# Patient Record
Sex: FEMALE | Race: WHITE
[De-identification: names, ages, dates, MRNs, and addresses within clinical notes are randomized per-mention and may not be internally consistent; named-entity substitution may affect disease eponyms.]

---

## 2018-10-25 ENCOUNTER — APPOINTMENT (OUTPATIENT)
Dept: HUMAN REPRODUCTION | Facility: CLINIC | Age: 38
End: 2018-10-25
Payer: COMMERCIAL

## 2018-10-25 PROCEDURE — 99203 OFFICE O/P NEW LOW 30 MIN: CPT

## 2018-12-31 ENCOUNTER — APPOINTMENT (OUTPATIENT)
Dept: HUMAN REPRODUCTION | Facility: CLINIC | Age: 38
End: 2018-12-31
Payer: COMMERCIAL

## 2018-12-31 ENCOUNTER — TRANSCRIPTION ENCOUNTER (OUTPATIENT)
Age: 38
End: 2018-12-31

## 2018-12-31 PROCEDURE — 76830 TRANSVAGINAL US NON-OB: CPT

## 2018-12-31 PROCEDURE — 36415 COLL VENOUS BLD VENIPUNCTURE: CPT

## 2018-12-31 PROCEDURE — 99213 OFFICE O/P EST LOW 20 MIN: CPT | Mod: 25

## 2019-01-09 ENCOUNTER — APPOINTMENT (OUTPATIENT)
Dept: HUMAN REPRODUCTION | Facility: CLINIC | Age: 39
End: 2019-01-09
Payer: COMMERCIAL

## 2019-01-09 PROCEDURE — 36415 COLL VENOUS BLD VENIPUNCTURE: CPT

## 2019-01-09 PROCEDURE — 76830 TRANSVAGINAL US NON-OB: CPT

## 2019-01-09 PROCEDURE — 99213 OFFICE O/P EST LOW 20 MIN: CPT | Mod: 25

## 2019-01-14 ENCOUNTER — APPOINTMENT (OUTPATIENT)
Dept: HUMAN REPRODUCTION | Facility: CLINIC | Age: 39
End: 2019-01-14
Payer: COMMERCIAL

## 2019-01-14 PROCEDURE — 36415 COLL VENOUS BLD VENIPUNCTURE: CPT

## 2019-01-14 PROCEDURE — 76830 TRANSVAGINAL US NON-OB: CPT

## 2019-01-14 PROCEDURE — 99213 OFFICE O/P EST LOW 20 MIN: CPT | Mod: 25

## 2019-01-15 ENCOUNTER — APPOINTMENT (OUTPATIENT)
Dept: HUMAN REPRODUCTION | Facility: CLINIC | Age: 39
End: 2019-01-15
Payer: COMMERCIAL

## 2019-01-15 PROCEDURE — 58322 ARTIFICIAL INSEMINATION: CPT

## 2019-01-15 PROCEDURE — 89261 SPERM ISOLATION COMPLEX: CPT

## 2019-01-15 PROCEDURE — 99213 OFFICE O/P EST LOW 20 MIN: CPT | Mod: 25

## 2019-01-15 PROCEDURE — 58323 SPERM WASHING: CPT

## 2019-01-15 RX ORDER — LEVOTHYROXINE SODIUM 0.05 MG/1
50 TABLET ORAL DAILY
Qty: 30 | Refills: 3 | Status: ACTIVE | COMMUNITY
Start: 2019-01-15 | End: 1900-01-01

## 2019-01-16 ENCOUNTER — APPOINTMENT (OUTPATIENT)
Dept: HUMAN REPRODUCTION | Facility: CLINIC | Age: 39
End: 2019-01-16
Payer: SELF-PAY

## 2019-01-16 PROCEDURE — 58323 SPERM WASHING: CPT

## 2019-01-16 PROCEDURE — 58322 ARTIFICIAL INSEMINATION: CPT

## 2019-01-16 PROCEDURE — 89261 SPERM ISOLATION COMPLEX: CPT

## 2019-01-16 PROCEDURE — 99213 OFFICE O/P EST LOW 20 MIN: CPT | Mod: 25

## 2019-01-23 ENCOUNTER — APPOINTMENT (OUTPATIENT)
Dept: HUMAN REPRODUCTION | Facility: CLINIC | Age: 39
End: 2019-01-23
Payer: COMMERCIAL

## 2019-01-23 PROCEDURE — 76830 TRANSVAGINAL US NON-OB: CPT

## 2019-01-23 PROCEDURE — 36415 COLL VENOUS BLD VENIPUNCTURE: CPT

## 2019-01-23 PROCEDURE — 99213 OFFICE O/P EST LOW 20 MIN: CPT | Mod: 25

## 2019-01-31 ENCOUNTER — APPOINTMENT (OUTPATIENT)
Dept: HUMAN REPRODUCTION | Facility: CLINIC | Age: 39
End: 2019-01-31
Payer: COMMERCIAL

## 2019-01-31 PROCEDURE — 99213 OFFICE O/P EST LOW 20 MIN: CPT | Mod: 25

## 2019-01-31 PROCEDURE — 76830 TRANSVAGINAL US NON-OB: CPT

## 2019-01-31 PROCEDURE — 36415 COLL VENOUS BLD VENIPUNCTURE: CPT

## 2019-02-02 ENCOUNTER — APPOINTMENT (OUTPATIENT)
Dept: HUMAN REPRODUCTION | Facility: CLINIC | Age: 39
End: 2019-02-02
Payer: COMMERCIAL

## 2019-02-02 PROCEDURE — 36415 COLL VENOUS BLD VENIPUNCTURE: CPT

## 2019-02-02 PROCEDURE — 99213 OFFICE O/P EST LOW 20 MIN: CPT | Mod: 25

## 2019-02-02 PROCEDURE — 76830 TRANSVAGINAL US NON-OB: CPT

## 2019-02-06 ENCOUNTER — APPOINTMENT (OUTPATIENT)
Dept: HUMAN REPRODUCTION | Facility: CLINIC | Age: 39
End: 2019-02-06

## 2019-02-22 ENCOUNTER — APPOINTMENT (OUTPATIENT)
Dept: HUMAN REPRODUCTION | Facility: CLINIC | Age: 39
End: 2019-02-22
Payer: COMMERCIAL

## 2019-02-22 PROCEDURE — 36415 COLL VENOUS BLD VENIPUNCTURE: CPT

## 2019-02-22 PROCEDURE — 99213 OFFICE O/P EST LOW 20 MIN: CPT | Mod: 25

## 2019-02-22 PROCEDURE — 76830 TRANSVAGINAL US NON-OB: CPT

## 2019-02-25 ENCOUNTER — APPOINTMENT (OUTPATIENT)
Dept: HUMAN REPRODUCTION | Facility: CLINIC | Age: 39
End: 2019-02-25
Payer: COMMERCIAL

## 2019-02-25 PROCEDURE — 99213 OFFICE O/P EST LOW 20 MIN: CPT | Mod: 25

## 2019-02-25 PROCEDURE — 36415 COLL VENOUS BLD VENIPUNCTURE: CPT

## 2019-02-28 ENCOUNTER — APPOINTMENT (OUTPATIENT)
Dept: HUMAN REPRODUCTION | Facility: CLINIC | Age: 39
End: 2019-02-28
Payer: COMMERCIAL

## 2019-02-28 PROCEDURE — 36415 COLL VENOUS BLD VENIPUNCTURE: CPT

## 2019-02-28 PROCEDURE — 76830 TRANSVAGINAL US NON-OB: CPT

## 2019-02-28 PROCEDURE — 99213 OFFICE O/P EST LOW 20 MIN: CPT | Mod: 25

## 2019-03-02 ENCOUNTER — APPOINTMENT (OUTPATIENT)
Dept: HUMAN REPRODUCTION | Facility: CLINIC | Age: 39
End: 2019-03-02
Payer: COMMERCIAL

## 2019-03-02 PROCEDURE — 99213 OFFICE O/P EST LOW 20 MIN: CPT | Mod: 25

## 2019-03-02 PROCEDURE — 36415 COLL VENOUS BLD VENIPUNCTURE: CPT

## 2019-03-02 PROCEDURE — 76830 TRANSVAGINAL US NON-OB: CPT

## 2019-03-03 ENCOUNTER — TRANSCRIPTION ENCOUNTER (OUTPATIENT)
Age: 39
End: 2019-03-03

## 2019-03-05 ENCOUNTER — APPOINTMENT (OUTPATIENT)
Dept: HUMAN REPRODUCTION | Facility: CLINIC | Age: 39
End: 2019-03-05
Payer: COMMERCIAL

## 2019-03-05 PROCEDURE — 76830 TRANSVAGINAL US NON-OB: CPT

## 2019-03-05 PROCEDURE — 99213 OFFICE O/P EST LOW 20 MIN: CPT | Mod: 25

## 2019-03-05 PROCEDURE — 36415 COLL VENOUS BLD VENIPUNCTURE: CPT

## 2019-03-06 ENCOUNTER — RX RENEWAL (OUTPATIENT)
Age: 39
End: 2019-03-06

## 2019-03-08 ENCOUNTER — APPOINTMENT (OUTPATIENT)
Dept: HUMAN REPRODUCTION | Facility: CLINIC | Age: 39
End: 2019-03-08
Payer: COMMERCIAL

## 2019-03-08 PROCEDURE — 58322 ARTIFICIAL INSEMINATION: CPT

## 2019-03-08 PROCEDURE — 76830 TRANSVAGINAL US NON-OB: CPT

## 2019-03-08 PROCEDURE — 89261 SPERM ISOLATION COMPLEX: CPT

## 2019-03-08 PROCEDURE — 99213 OFFICE O/P EST LOW 20 MIN: CPT | Mod: 25

## 2019-03-23 ENCOUNTER — APPOINTMENT (OUTPATIENT)
Dept: HUMAN REPRODUCTION | Facility: CLINIC | Age: 39
End: 2019-03-23
Payer: COMMERCIAL

## 2019-03-23 PROCEDURE — 99213 OFFICE O/P EST LOW 20 MIN: CPT | Mod: 25

## 2019-03-23 PROCEDURE — 76830 TRANSVAGINAL US NON-OB: CPT

## 2019-03-23 PROCEDURE — 36415 COLL VENOUS BLD VENIPUNCTURE: CPT

## 2019-03-28 ENCOUNTER — APPOINTMENT (OUTPATIENT)
Dept: HUMAN REPRODUCTION | Facility: CLINIC | Age: 39
End: 2019-03-28
Payer: COMMERCIAL

## 2019-03-28 PROCEDURE — 76830 TRANSVAGINAL US NON-OB: CPT

## 2019-03-28 PROCEDURE — 99213 OFFICE O/P EST LOW 20 MIN: CPT | Mod: 25

## 2019-03-28 PROCEDURE — 36415 COLL VENOUS BLD VENIPUNCTURE: CPT

## 2019-04-01 ENCOUNTER — APPOINTMENT (OUTPATIENT)
Dept: HUMAN REPRODUCTION | Facility: CLINIC | Age: 39
End: 2019-04-01
Payer: COMMERCIAL

## 2019-04-01 PROCEDURE — 76830 TRANSVAGINAL US NON-OB: CPT

## 2019-04-01 PROCEDURE — 36415 COLL VENOUS BLD VENIPUNCTURE: CPT

## 2019-04-01 PROCEDURE — 99213 OFFICE O/P EST LOW 20 MIN: CPT | Mod: 25

## 2019-04-03 ENCOUNTER — APPOINTMENT (OUTPATIENT)
Dept: HUMAN REPRODUCTION | Facility: CLINIC | Age: 39
End: 2019-04-03
Payer: COMMERCIAL

## 2019-04-03 PROCEDURE — 99213 OFFICE O/P EST LOW 20 MIN: CPT | Mod: 25

## 2019-04-03 PROCEDURE — 76830 TRANSVAGINAL US NON-OB: CPT

## 2019-04-03 PROCEDURE — 36415 COLL VENOUS BLD VENIPUNCTURE: CPT

## 2019-04-04 ENCOUNTER — APPOINTMENT (OUTPATIENT)
Dept: HUMAN REPRODUCTION | Facility: CLINIC | Age: 39
End: 2019-04-04
Payer: COMMERCIAL

## 2019-04-04 PROCEDURE — 76830 TRANSVAGINAL US NON-OB: CPT

## 2019-04-04 PROCEDURE — 99213 OFFICE O/P EST LOW 20 MIN: CPT | Mod: 25

## 2019-04-04 PROCEDURE — 36415 COLL VENOUS BLD VENIPUNCTURE: CPT

## 2019-04-05 ENCOUNTER — APPOINTMENT (OUTPATIENT)
Dept: HUMAN REPRODUCTION | Facility: CLINIC | Age: 39
End: 2019-04-05

## 2019-04-05 ENCOUNTER — APPOINTMENT (OUTPATIENT)
Dept: HUMAN REPRODUCTION | Facility: CLINIC | Age: 39
End: 2019-04-05
Payer: COMMERCIAL

## 2019-04-05 PROCEDURE — 99213 OFFICE O/P EST LOW 20 MIN: CPT | Mod: 25

## 2019-04-05 PROCEDURE — 89261 SPERM ISOLATION COMPLEX: CPT

## 2019-04-05 PROCEDURE — 76830 TRANSVAGINAL US NON-OB: CPT

## 2019-04-05 PROCEDURE — 58322 ARTIFICIAL INSEMINATION: CPT

## 2019-04-25 ENCOUNTER — APPOINTMENT (OUTPATIENT)
Dept: HUMAN REPRODUCTION | Facility: CLINIC | Age: 39
End: 2019-04-25
Payer: COMMERCIAL

## 2019-04-25 PROCEDURE — 99213 OFFICE O/P EST LOW 20 MIN: CPT | Mod: 25

## 2019-04-25 PROCEDURE — 76830 TRANSVAGINAL US NON-OB: CPT

## 2019-04-25 PROCEDURE — 36415 COLL VENOUS BLD VENIPUNCTURE: CPT

## 2019-05-02 ENCOUNTER — APPOINTMENT (OUTPATIENT)
Dept: HUMAN REPRODUCTION | Facility: CLINIC | Age: 39
End: 2019-05-02
Payer: COMMERCIAL

## 2019-05-02 PROCEDURE — 36415 COLL VENOUS BLD VENIPUNCTURE: CPT

## 2019-05-02 PROCEDURE — 76830 TRANSVAGINAL US NON-OB: CPT

## 2019-05-02 PROCEDURE — 99213 OFFICE O/P EST LOW 20 MIN: CPT | Mod: 25

## 2019-05-03 ENCOUNTER — APPOINTMENT (OUTPATIENT)
Dept: HUMAN REPRODUCTION | Facility: CLINIC | Age: 39
End: 2019-05-03
Payer: COMMERCIAL

## 2019-05-03 PROCEDURE — 99214 OFFICE O/P EST MOD 30 MIN: CPT | Mod: 25

## 2019-05-03 PROCEDURE — 58322 ARTIFICIAL INSEMINATION: CPT

## 2019-05-03 PROCEDURE — 89261 SPERM ISOLATION COMPLEX: CPT

## 2021-01-19 ENCOUNTER — NON-APPOINTMENT (OUTPATIENT)
Age: 41
End: 2021-01-19

## 2021-01-19 ENCOUNTER — APPOINTMENT (OUTPATIENT)
Dept: INTERNAL MEDICINE | Facility: CLINIC | Age: 41
End: 2021-01-19
Payer: COMMERCIAL

## 2021-01-19 VITALS
WEIGHT: 103 LBS | HEART RATE: 67 BPM | RESPIRATION RATE: 17 BRPM | OXYGEN SATURATION: 100 % | HEIGHT: 60 IN | DIASTOLIC BLOOD PRESSURE: 75 MMHG | SYSTOLIC BLOOD PRESSURE: 128 MMHG | BODY MASS INDEX: 20.22 KG/M2 | TEMPERATURE: 97.4 F

## 2021-01-19 VITALS
BODY MASS INDEX: 20.22 KG/M2 | RESPIRATION RATE: 17 BRPM | HEIGHT: 60 IN | OXYGEN SATURATION: 100 % | WEIGHT: 103 LBS | HEART RATE: 67 BPM | TEMPERATURE: 97.4 F

## 2021-01-19 VITALS — SYSTOLIC BLOOD PRESSURE: 110 MMHG | DIASTOLIC BLOOD PRESSURE: 64 MMHG

## 2021-01-19 DIAGNOSIS — Z78.9 OTHER SPECIFIED HEALTH STATUS: ICD-10-CM

## 2021-01-19 DIAGNOSIS — Z80.1 FAMILY HISTORY OF MALIGNANT NEOPLASM OF TRACHEA, BRONCHUS AND LUNG: ICD-10-CM

## 2021-01-19 DIAGNOSIS — Z82.49 FAMILY HISTORY OF ISCHEMIC HEART DISEASE AND OTHER DISEASES OF THE CIRCULATORY SYSTEM: ICD-10-CM

## 2021-01-19 PROCEDURE — 99386 PREV VISIT NEW AGE 40-64: CPT | Mod: 25

## 2021-01-19 PROCEDURE — 99072 ADDL SUPL MATRL&STAF TM PHE: CPT

## 2021-01-19 PROCEDURE — 93000 ELECTROCARDIOGRAM COMPLETE: CPT

## 2021-01-19 RX ORDER — AZITHROMYCIN 500 MG/1
500 TABLET, FILM COATED ORAL
Qty: 2 | Refills: 0 | Status: COMPLETED | COMMUNITY
Start: 2020-12-22

## 2021-01-19 RX ORDER — FLUCONAZOLE 150 MG/1
150 TABLET ORAL
Qty: 2 | Refills: 0 | Status: COMPLETED | COMMUNITY
Start: 2020-08-24

## 2021-01-19 RX ORDER — CABERGOLINE 0.5 MG/1
0.5 TABLET ORAL
Qty: 4 | Refills: 6 | Status: COMPLETED | COMMUNITY
Start: 2019-02-25 | End: 2021-01-19

## 2021-01-19 RX ORDER — TERCONAZOLE 4 MG/G
0.4 CREAM VAGINAL
Qty: 45 | Refills: 0 | Status: COMPLETED | COMMUNITY
Start: 2020-08-24

## 2021-01-19 NOTE — DATA REVIEWED
[FreeTextEntry1] : Recent records including mammogram, breast ultrasound, blood work, Pap smear were reviewed.

## 2021-01-19 NOTE — ASSESSMENT
[FreeTextEntry1] : Healthcare maintenance: Patient will be due for repeat mammogram and breast ultrasound next month.  She will obtain prescriptions for these studies from her gynecologist.  She is up-to-date with Pap smear exam.

## 2021-01-19 NOTE — PHYSICAL EXAM
[Normal Sclera/Conjunctiva] : normal sclera/conjunctiva [EOMI] : extraocular movements intact [Normal TMs] : both tympanic membranes were normal [No Lymphadenopathy] : no lymphadenopathy [Supple] : supple [Thyroid Normal, No Nodules] : the thyroid was normal and there were no nodules present [No Edema] : there was no peripheral edema [Normal] : soft, non-tender, non-distended, no masses palpated, no HSM and normal bowel sounds [Normal Posterior Cervical Nodes] : no posterior cervical lymphadenopathy [Normal Anterior Cervical Nodes] : no anterior cervical lymphadenopathy [Grossly Normal Strength/Tone] : grossly normal strength/tone [No Rash] : no rash [No Focal Deficits] : no focal deficits [Normal Affect] : the affect was normal [Alert and Oriented x3] : oriented to person, place, and time [Normal Insight/Judgement] : insight and judgment were intact

## 2021-01-19 NOTE — HISTORY OF PRESENT ILLNESS
[FreeTextEntry1] : The patient is a 40 year  old female who presents for annual physical examination and to reestablish care. \par  [de-identified] : Patient is a 40-year-old female with past medical history significant for female infertility, hypothyroidism, presents for annual wellness exam and to reestablish care.  Patient is currently under the care of Dr. Estefany Napoles (reproductive endocrinologist) and undergoing fertility treatments. \par She continues to follow-up routinely with her ophthalmologist for history of optic nerve atrophy detected many years ago.  Her intraocular pressures have remained normal as per patient.\par Patient also reports having had a mammogram and breast ultrasounds in August, 2020.  Mammogram at that time revealed probably benign left breast calcifications, repeat recommended for February, 2021.\par She has otherwise been feeling well and offers no new complaints.

## 2021-01-19 NOTE — HEALTH RISK ASSESSMENT
[Good] : ~his/her~  mood as  good [No] : No [0] : 2) Feeling down, depressed, or hopeless: Not at all (0) [Patient reported PAP Smear was normal] : Patient reported PAP Smear was normal [With Significant Other] : lives with significant other [Employed] : employed [] :  [Feels Safe at Home] : Feels safe at home [Fully functional (bathing, dressing, toileting, transferring, walking, feeding)] : Fully functional (bathing, dressing, toileting, transferring, walking, feeding) [Fully functional (using the telephone, shopping, preparing meals, housekeeping, doing laundry, using] : Fully functional and needs no help or supervision to perform IADLs (using the telephone, shopping, preparing meals, housekeeping, doing laundry, using transportation, managing medications and managing finances) [] : No [de-identified] : exercises regularly [Change in mental status noted] : No change in mental status noted [Language] : denies difficulty with language [Behavior] : denies difficulty with behavior [Handling Complex Tasks] : denies difficulty handling complex tasks [Reasoning] : denies difficulty with reasoning [Reports changes in hearing] : Reports no changes in hearing [Reports changes in vision] : Reports no changes in vision [Reports changes in dental health] : Reports no changes in dental health [MammogramDate] : 02/20 [MammogramComments] : repeat left breast mammogram 8/20- probably benign calcifications. [PapSmearDate] : 08/20

## 2022-03-22 ENCOUNTER — APPOINTMENT (OUTPATIENT)
Dept: INTERNAL MEDICINE | Facility: CLINIC | Age: 42
End: 2022-03-22
Payer: COMMERCIAL

## 2022-03-22 ENCOUNTER — NON-APPOINTMENT (OUTPATIENT)
Age: 42
End: 2022-03-22

## 2022-03-22 VITALS
BODY MASS INDEX: 21.2 KG/M2 | WEIGHT: 108 LBS | SYSTOLIC BLOOD PRESSURE: 125 MMHG | HEART RATE: 113 BPM | DIASTOLIC BLOOD PRESSURE: 71 MMHG | RESPIRATION RATE: 17 BRPM | OXYGEN SATURATION: 99 % | TEMPERATURE: 97.6 F | HEIGHT: 60 IN

## 2022-03-22 PROCEDURE — 99396 PREV VISIT EST AGE 40-64: CPT | Mod: 25

## 2022-03-22 PROCEDURE — 93000 ELECTROCARDIOGRAM COMPLETE: CPT

## 2022-03-22 RX ORDER — CLOMIPHENE CITRATE 50 MG/1
50 TABLET ORAL DAILY
Qty: 5 | Refills: 0 | Status: DISCONTINUED | COMMUNITY
Start: 2019-03-23 | End: 2022-03-22

## 2022-03-22 NOTE — PHYSICAL EXAM
[Normal Sclera/Conjunctiva] : normal sclera/conjunctiva [PERRL] : pupils equal round and reactive to light [EOMI] : extraocular movements intact [Normal TMs] : both tympanic membranes were normal [No Lymphadenopathy] : no lymphadenopathy [Supple] : supple [Thyroid Normal, No Nodules] : the thyroid was normal and there were no nodules present [No Carotid Bruits] : no carotid bruits [No Edema] : there was no peripheral edema [Normal Appearance] : normal in appearance [No Masses] : no palpable masses [No Nipple Discharge] : no nipple discharge [No Axillary Lymphadenopathy] : no axillary lymphadenopathy [Normal] : soft, non-tender, non-distended, no masses palpated, no HSM and normal bowel sounds [Normal Posterior Cervical Nodes] : no posterior cervical lymphadenopathy [Normal Anterior Cervical Nodes] : no anterior cervical lymphadenopathy [Grossly Normal Strength/Tone] : grossly normal strength/tone [No Rash] : no rash [No Focal Deficits] : no focal deficits [Normal Affect] : the affect was normal [Normal Insight/Judgement] : insight and judgment were intact

## 2022-03-22 NOTE — ASSESSMENT
[FreeTextEntry1] : Healthcare maintenance: Patient is due for repeat mammography this month.  She will ensure that she is not pregnant prior to submitting to this test.\par Up-to-date with Pap smear.\par Continue healthy diet and exercise.

## 2022-03-22 NOTE — HISTORY OF PRESENT ILLNESS
[FreeTextEntry1] : The patient is a 41 year old female who presents for annual physical examination.\par  [de-identified] : Patient is a 41-year-old female with past medical history significant for hypothyroidism, hyperprolactinemia,  infertility, presents for annual wellness exam.\par She continues to undergo fertility treatments.\par Reports undergoing a stereotactic core breast biopsy March, 2021 which revealed small duct papilloma (benign).  Repeat mammography October, 2021 was negative.\par Pap smear collected September, 2021+ for bacterial vaginosis treated with MetroGel.  Cytology negative for intraepithelial lesion or malignancy.\par Patient has received series of 2 COVID-19 vaccinations.  Up-to-date with influenza vaccine.\par Has not been exercising as regularly due to very busy work schedule.\par

## 2022-03-22 NOTE — HEALTH RISK ASSESSMENT
[Good] : ~his/her~  mood as  good [Never] : Never [No] : No [No falls in past year] : Patient reported no falls in the past year [0] : 2) Feeling down, depressed, or hopeless: Not at all (0) [Fully functional (bathing, dressing, toileting, transferring, walking, feeding)] : Fully functional (bathing, dressing, toileting, transferring, walking, feeding) [Fully functional (using the telephone, shopping, preparing meals, housekeeping, doing laundry, using] : Fully functional and needs no help or supervision to perform IADLs (using the telephone, shopping, preparing meals, housekeeping, doing laundry, using transportation, managing medications and managing finances) [Smoke Detector] : smoke detector [Carbon Monoxide Detector] : carbon monoxide detector [Seat Belt] :  uses seat belt [Sunscreen] : uses sunscreen [With Significant Other] : lives with significant other [Employed] : employed [Graduate School] : graduate school [] :  [Feels Safe at Home] : Feels safe at home [Change in mental status noted] : No change in mental status noted [Language] : denies difficulty with language [Behavior] : denies difficulty with behavior [Handling Complex Tasks] : denies difficulty handling complex tasks [Reasoning] : denies difficulty with reasoning [Reports changes in hearing] : Reports no changes in hearing [Reports changes in vision] : Reports no changes in vision [Reports changes in dental health] : Reports no changes in dental health [MammogramDate] : 10/2021 [PapSmearDate] : 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

## 2022-09-01 ENCOUNTER — NON-APPOINTMENT (OUTPATIENT)
Age: 42
End: 2022-09-01

## 2023-03-29 ENCOUNTER — NON-APPOINTMENT (OUTPATIENT)
Age: 43
End: 2023-03-29

## 2023-03-29 ENCOUNTER — APPOINTMENT (OUTPATIENT)
Dept: INTERNAL MEDICINE | Facility: CLINIC | Age: 43
End: 2023-03-29
Payer: COMMERCIAL

## 2023-03-29 VITALS
SYSTOLIC BLOOD PRESSURE: 118 MMHG | BODY MASS INDEX: 20.62 KG/M2 | RESPIRATION RATE: 17 BRPM | WEIGHT: 105 LBS | DIASTOLIC BLOOD PRESSURE: 80 MMHG | OXYGEN SATURATION: 98 % | TEMPERATURE: 97.8 F | HEIGHT: 60 IN | HEART RATE: 71 BPM

## 2023-03-29 DIAGNOSIS — N97.9 FEMALE INFERTILITY, UNSPECIFIED: ICD-10-CM

## 2023-03-29 PROCEDURE — 93000 ELECTROCARDIOGRAM COMPLETE: CPT

## 2023-03-29 PROCEDURE — 99396 PREV VISIT EST AGE 40-64: CPT | Mod: 25

## 2023-03-31 PROBLEM — N97.9 FEMALE INFERTILITY: Status: ACTIVE | Noted: 2019-01-16

## 2023-03-31 RX ORDER — PROGESTERONE 50 MG/ML
50 INJECTION, SOLUTION INTRAMUSCULAR
Qty: 20 | Refills: 0 | Status: COMPLETED | COMMUNITY
Start: 2020-12-23 | End: 2023-03-31

## 2023-03-31 RX ORDER — SYRINGE WITH NEEDLE, 1 ML 25GX5/8"
22G X 1-1/2" SYRINGE, EMPTY DISPOSABLE MISCELLANEOUS
Qty: 30 | Refills: 2 | Status: COMPLETED | COMMUNITY
Start: 2019-01-31 | End: 2023-03-31

## 2023-03-31 RX ORDER — CHORIONIC GONADOTROPIN 10000 UNIT
10000 KIT INTRAMUSCULAR
Qty: 1 | Refills: 5 | Status: COMPLETED | COMMUNITY
Start: 2019-01-31 | End: 2023-03-31

## 2023-03-31 RX ORDER — NORGESTIMATE AND ETHINYL ESTRADIOL 0.25-0.035
0.25-35 KIT ORAL
Qty: 84 | Refills: 0 | Status: COMPLETED | COMMUNITY
Start: 2020-12-12 | End: 2023-03-31

## 2023-03-31 RX ORDER — LEUPROLIDE ACETATE 1 MG/0.2ML
1 KIT SUBCUTANEOUS
Qty: 2 | Refills: 0 | Status: COMPLETED | COMMUNITY
Start: 2020-11-04 | End: 2023-03-31

## 2023-03-31 RX ORDER — NEEDLES, DISPOSABLE 25GX5/8"
27G X 1/2" NEEDLE, DISPOSABLE MISCELLANEOUS
Qty: 30 | Refills: 3 | Status: COMPLETED | COMMUNITY
Start: 2019-01-31 | End: 2023-03-31

## 2023-03-31 RX ORDER — ESTRADIOL 0.1 MG/D
0.1 PATCH, EXTENDED RELEASE TRANSDERMAL
Qty: 1 | Refills: 2 | Status: COMPLETED | COMMUNITY
Start: 2019-01-16 | End: 2023-03-31

## 2023-03-31 RX ORDER — ESTRADIOL 2 MG/1
2 TABLET ORAL
Qty: 90 | Refills: 0 | Status: COMPLETED | COMMUNITY
Start: 2020-11-24 | End: 2023-03-31

## 2023-03-31 RX ORDER — MENOTROPINS 75 UNIT
75 KIT SUBCUTANEOUS DAILY
Qty: 40 | Refills: 2 | Status: COMPLETED | COMMUNITY
Start: 2019-01-31 | End: 2023-03-31

## 2023-03-31 RX ORDER — CHORIOGONADOTROPIN ALFA 10000 UNIT
10000 KIT INTRAMUSCULAR
Qty: 1 | Refills: 3 | Status: COMPLETED | COMMUNITY
Start: 2019-03-05 | End: 2023-03-31

## 2023-03-31 NOTE — PHYSICAL EXAM
[Normal Oropharynx] : the oropharynx was normal [Normal TMs] : both tympanic membranes were normal [Normal] : soft, non-tender, non-distended, no masses palpated, no HSM and normal bowel sounds [Normal Posterior Cervical Nodes] : no posterior cervical lymphadenopathy [Normal Anterior Cervical Nodes] : no anterior cervical lymphadenopathy [No Rash] : no rash [No Focal Deficits] : no focal deficits [Normal Affect] : the affect was normal [Alert and Oriented x3] : oriented to person, place, and time [Normal Insight/Judgement] : insight and judgment were intact

## 2023-03-31 NOTE — DATA REVIEWED
[FreeTextEntry1] : EKG: Normal sinus rhythm at 65 bpm, within normal limits.\par \par MRA results reviewed\par Mammography results reviewed\par Pap smear results reviewed\par Consultation note from interventional neuroradiologist (Dr. Eduin Brownlee) reviewed

## 2023-03-31 NOTE — ASSESSMENT
[FreeTextEntry1] : Age-appropriate preventive care counseling and Healthcare maintenance discussed including but not limited to proper diet, exercise, routine dental care, skin cancer screening, and eye care.\par Patient informed that as per new guidelines, she is due for colonoscopy at age 43.\par

## 2023-03-31 NOTE — HISTORY OF PRESENT ILLNESS
[de-identified] : Patient is a 42-year-old female with past medical history significant for episodic hyperprolactinemia, hypothyroidism, female infertility, presents for annual wellness exam.\par Patient reports that she underwent D and C due to twin fetal demise at 7 weeks (4/2022).\par Brain MRI was performed last year for evaluation of episodic hyperprolactinemia which incidentally revealed right internal carotid artery aneurysm.  She subsequently underwent coil embolization and stent placement at [facility] (8/23/2022) and took Brilinta for 3 months.\par She remains on daily aspirin\par Repeat MRA (2/21/2023) revealed resolution of aneurysm.\par She anticipates undergoing another embryo transfer and has been cleared by her neurosurgeon to proceed.\par She is up-to-date with her GYN exam/Pap smear and mammography.\par Offers no new complaints

## 2023-03-31 NOTE — HEALTH RISK ASSESSMENT
[Good] : ~his/her~  mood as  good [No] : No [No falls in past year] : Patient reported no falls in the past year [0] : 2) Feeling down, depressed, or hopeless: Not at all (0) [PHQ-2 Negative - No further assessment needed] : PHQ-2 Negative - No further assessment needed [With Significant Other] : lives with significant other [] :  [Feels Safe at Home] : Feels safe at home [Fully functional (bathing, dressing, toileting, transferring, walking, feeding)] : Fully functional (bathing, dressing, toileting, transferring, walking, feeding) [Fully functional (using the telephone, shopping, preparing meals, housekeeping, doing laundry, using] : Fully functional and needs no help or supervision to perform IADLs (using the telephone, shopping, preparing meals, housekeeping, doing laundry, using transportation, managing medications and managing finances) [Smoke Detector] : smoke detector [Carbon Monoxide Detector] : carbon monoxide detector [Seat Belt] :  uses seat belt [Sunscreen] : uses sunscreen [Never] : Never [Graduate School] : graduate school [de-identified] : exercises 3 times per week [[id number]] : 0 [Change in mental status noted] : No change in mental status noted [Language] : denies difficulty with language [Behavior] : denies difficulty with behavior [Handling Complex Tasks] : denies difficulty handling complex tasks [Reasoning] : denies difficulty with reasoning [Reports changes in hearing] : Reports no changes in hearing [Reports changes in vision] : Reports no changes in vision [Reports changes in dental health] : Reports no changes in dental health [PapSmearDate] : 9/6/2022 [MammogramDate] : 12/7/2022 [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

## 2023-04-04 LAB
25(OH)D3 SERPL-MCNC: 22.7 NG/ML
ALBUMIN SERPL ELPH-MCNC: 5 G/DL
ALP BLD-CCNC: 61 U/L
ALT SERPL-CCNC: 25 U/L
ANION GAP SERPL CALC-SCNC: 14 MMOL/L
APO LP(A) SERPL-MCNC: 45.4 NMOL/L
APPEARANCE: CLEAR
AST SERPL-CCNC: 25 U/L
BASOPHILS # BLD AUTO: 0.05 K/UL
BASOPHILS NFR BLD AUTO: 0.6 %
BILIRUB SERPL-MCNC: 0.5 MG/DL
BILIRUBIN URINE: NEGATIVE
BLOOD URINE: NEGATIVE
BUN SERPL-MCNC: 12 MG/DL
CALCIUM SERPL-MCNC: 9.8 MG/DL
CHLORIDE SERPL-SCNC: 102 MMOL/L
CHOLEST SERPL-MCNC: 235 MG/DL
CO2 SERPL-SCNC: 24 MMOL/L
COLOR: COLORLESS
CREAT SERPL-MCNC: 0.71 MG/DL
EGFR: 107 ML/MIN/1.73M2
EOSINOPHIL # BLD AUTO: 0.09 K/UL
EOSINOPHIL NFR BLD AUTO: 1.1 %
ESTIMATED AVERAGE GLUCOSE: 94 MG/DL
FOLATE SERPL-MCNC: >20 NG/ML
GLUCOSE QUALITATIVE U: NEGATIVE
GLUCOSE SERPL-MCNC: 92 MG/DL
HBA1C MFR BLD HPLC: 4.9 %
HCT VFR BLD CALC: 41.6 %
HDLC SERPL-MCNC: 99 MG/DL
HGB BLD-MCNC: 13.9 G/DL
IMM GRANULOCYTES NFR BLD AUTO: 0.4 %
KETONES URINE: NEGATIVE
LDLC SERPL CALC-MCNC: 115 MG/DL
LEUKOCYTE ESTERASE URINE: NEGATIVE
LYMPHOCYTES # BLD AUTO: 2.92 K/UL
LYMPHOCYTES NFR BLD AUTO: 34.8 %
MAN DIFF?: NORMAL
MCHC RBC-ENTMCNC: 33.4 GM/DL
MCHC RBC-ENTMCNC: 34.2 PG
MCV RBC AUTO: 102.5 FL
MONOCYTES # BLD AUTO: 0.8 K/UL
MONOCYTES NFR BLD AUTO: 9.5 %
NEUTROPHILS # BLD AUTO: 4.51 K/UL
NEUTROPHILS NFR BLD AUTO: 53.6 %
NITRITE URINE: NEGATIVE
NONHDLC SERPL-MCNC: 136 MG/DL
PH URINE: 6.5
PLATELET # BLD AUTO: 309 K/UL
POTASSIUM SERPL-SCNC: 4.2 MMOL/L
PROLACTIN SERPL-MCNC: 11.8 NG/ML
PROT SERPL-MCNC: 7.6 G/DL
PROTEIN URINE: NEGATIVE
RBC # BLD: 4.06 M/UL
RBC # FLD: 12.2 %
SODIUM SERPL-SCNC: 139 MMOL/L
SPECIFIC GRAVITY URINE: 1
TRIGL SERPL-MCNC: 104 MG/DL
TSH SERPL-ACNC: 1.65 UIU/ML
UROBILINOGEN URINE: NORMAL
VIT B12 SERPL-MCNC: 586 PG/ML
WBC # FLD AUTO: 8.4 K/UL

## 2024-04-09 ENCOUNTER — NON-APPOINTMENT (OUTPATIENT)
Age: 44
End: 2024-04-09

## 2024-04-09 ENCOUNTER — APPOINTMENT (OUTPATIENT)
Dept: INTERNAL MEDICINE | Facility: CLINIC | Age: 44
End: 2024-04-09
Payer: COMMERCIAL

## 2024-04-09 VITALS
OXYGEN SATURATION: 100 % | HEIGHT: 60 IN | RESPIRATION RATE: 17 BRPM | HEART RATE: 81 BPM | WEIGHT: 108 LBS | TEMPERATURE: 97.7 F | SYSTOLIC BLOOD PRESSURE: 133 MMHG | BODY MASS INDEX: 21.2 KG/M2 | DIASTOLIC BLOOD PRESSURE: 85 MMHG

## 2024-04-09 DIAGNOSIS — Z00.00 ENCOUNTER FOR GENERAL ADULT MEDICAL EXAMINATION W/OUT ABNORMAL FINDINGS: ICD-10-CM

## 2024-04-09 DIAGNOSIS — E03.9 HYPOTHYROIDISM, UNSPECIFIED: ICD-10-CM

## 2024-04-09 DIAGNOSIS — E22.1 HYPERPROLACTINEMIA: ICD-10-CM

## 2024-04-09 PROCEDURE — 99396 PREV VISIT EST AGE 40-64: CPT | Mod: 25

## 2024-04-09 PROCEDURE — 93000 ELECTROCARDIOGRAM COMPLETE: CPT

## 2024-04-11 PROBLEM — E22.1 HYPERPROLACTINEMIA: Status: ACTIVE | Noted: 2019-02-25

## 2024-04-11 PROBLEM — E03.9 HYPOTHYROIDISM: Status: ACTIVE | Noted: 2019-01-15

## 2024-04-11 NOTE — PHYSICAL EXAM
[No Acute Distress] : no acute distress [Well Nourished] : well nourished [Well Developed] : well developed [Well-Appearing] : well-appearing [Normal Sclera/Conjunctiva] : normal sclera/conjunctiva [PERRL] : pupils equal round and reactive to light [EOMI] : extraocular movements intact [Normal Outer Ear/Nose] : the outer ears and nose were normal in appearance [Normal Oropharynx] : the oropharynx was normal [No JVD] : no jugular venous distention [No Lymphadenopathy] : no lymphadenopathy [Supple] : supple [Thyroid Normal, No Nodules] : the thyroid was normal and there were no nodules present [No Respiratory Distress] : no respiratory distress  [No Accessory Muscle Use] : no accessory muscle use [Clear to Auscultation] : lungs were clear to auscultation bilaterally [Normal Rate] : normal rate  [Regular Rhythm] : with a regular rhythm [Normal S1, S2] : normal S1 and S2 [No Murmur] : no murmur heard [No Carotid Bruits] : no carotid bruits [No Abdominal Bruit] : a ~M bruit was not heard ~T in the abdomen [No Varicosities] : no varicosities [Pedal Pulses Present] : the pedal pulses are present [No Edema] : there was no peripheral edema [No Palpable Aorta] : no palpable aorta [No Extremity Clubbing/Cyanosis] : no extremity clubbing/cyanosis [Soft] : abdomen soft [Non Tender] : non-tender [Non-distended] : non-distended [No Masses] : no abdominal mass palpated [No HSM] : no HSM [Normal Bowel Sounds] : normal bowel sounds [Normal Posterior Cervical Nodes] : no posterior cervical lymphadenopathy [Normal Anterior Cervical Nodes] : no anterior cervical lymphadenopathy [No CVA Tenderness] : no CVA  tenderness [No Spinal Tenderness] : no spinal tenderness [No Joint Swelling] : no joint swelling [Grossly Normal Strength/Tone] : grossly normal strength/tone [No Rash] : no rash [Coordination Grossly Intact] : coordination grossly intact [No Focal Deficits] : no focal deficits [Normal Gait] : normal gait [Deep Tendon Reflexes (DTR)] : deep tendon reflexes were 2+ and symmetric [Normal Affect] : the affect was normal [Normal Insight/Judgement] : insight and judgment were intact [Normal TMs] : both tympanic membranes were normal [Declined Breast Exam] : declined breast exam  [Alert and Oriented x3] : oriented to person, place, and time

## 2024-04-16 LAB
25(OH)D3 SERPL-MCNC: 35.3 NG/ML
ALBUMIN SERPL ELPH-MCNC: 5 G/DL
ALP BLD-CCNC: 66 U/L
ALT SERPL-CCNC: 26 U/L
ANION GAP SERPL CALC-SCNC: 16 MMOL/L
APPEARANCE: CLEAR
AST SERPL-CCNC: 25 U/L
BASOPHILS # BLD AUTO: 0.07 K/UL
BASOPHILS NFR BLD AUTO: 0.8 %
BILIRUB SERPL-MCNC: 0.4 MG/DL
BILIRUBIN URINE: NEGATIVE
BLOOD URINE: NEGATIVE
BUN SERPL-MCNC: 17 MG/DL
CALCIUM SERPL-MCNC: 10 MG/DL
CHLORIDE SERPL-SCNC: 101 MMOL/L
CHOLEST SERPL-MCNC: 247 MG/DL
CO2 SERPL-SCNC: 22 MMOL/L
COLOR: YELLOW
CREAT SERPL-MCNC: 0.7 MG/DL
EGFR: 108 ML/MIN/1.73M2
EOSINOPHIL # BLD AUTO: 0.08 K/UL
EOSINOPHIL NFR BLD AUTO: 1 %
ESTIMATED AVERAGE GLUCOSE: 97 MG/DL
GLUCOSE QUALITATIVE U: NEGATIVE MG/DL
GLUCOSE SERPL-MCNC: 93 MG/DL
HBA1C MFR BLD HPLC: 5 %
HCT VFR BLD CALC: 40.1 %
HDLC SERPL-MCNC: 95 MG/DL
HGB BLD-MCNC: 13.6 G/DL
IMM GRANULOCYTES NFR BLD AUTO: 0.2 %
KETONES URINE: NEGATIVE MG/DL
LDLC SERPL CALC-MCNC: 134 MG/DL
LEUKOCYTE ESTERASE URINE: NEGATIVE
LYMPHOCYTES # BLD AUTO: 3.91 K/UL
LYMPHOCYTES NFR BLD AUTO: 46.7 %
MAN DIFF?: NORMAL
MCHC RBC-ENTMCNC: 32.7 PG
MCHC RBC-ENTMCNC: 33.9 GM/DL
MCV RBC AUTO: 96.4 FL
MONOCYTES # BLD AUTO: 0.7 K/UL
MONOCYTES NFR BLD AUTO: 8.4 %
NEUTROPHILS # BLD AUTO: 3.59 K/UL
NEUTROPHILS NFR BLD AUTO: 42.9 %
NITRITE URINE: NEGATIVE
NONHDLC SERPL-MCNC: 152 MG/DL
PH URINE: 6
PLATELET # BLD AUTO: 283 K/UL
POTASSIUM SERPL-SCNC: 4.4 MMOL/L
PROLACTIN SERPL-MCNC: 22.9 NG/ML
PROT SERPL-MCNC: 7.5 G/DL
PROTEIN URINE: NEGATIVE MG/DL
RBC # BLD: 4.16 M/UL
RBC # FLD: 12.2 %
SODIUM SERPL-SCNC: 139 MMOL/L
SPECIFIC GRAVITY URINE: 1.01
TRIGL SERPL-MCNC: 106 MG/DL
TSH SERPL-ACNC: 2.35 UIU/ML
UROBILINOGEN URINE: 0.2 MG/DL
WBC # FLD AUTO: 8.37 K/UL

## 2024-04-16 NOTE — ASSESSMENT
Called pt. He states that he just got dental insurance and he will setup and appt with a dentist ASAP. No further questions or concerns.   [FreeTextEntry1] : Healthcare maintenance: Patient up-to-date with mammography, breast ultrasound, Pap smear. She is overdue for colonoscopy per current guidelines.  Referral provided. Age-appropriate preventive care counseling and Healthcare maintenance discussed including but not limited to proper diet, exercise, routine dental care, skin cancer screening, and eye care.  No medical contraindication for planned embryo transfer. 3 meals/day + snacks. Breakfast: coffee + bread/cookies; Lunch: sandwich, pizza; Dinner: protein + vegetable + starch. No vitamins/supplements PTA./good

## 2024-04-16 NOTE — HISTORY OF PRESENT ILLNESS
[FreeTextEntry1] : The patient is a 44 year  old female who presents for annual physical examination. [de-identified] : Patient is a 44-year-old female with past medical history significant for hypothyroidism, hyperprolactinemia, internal carotid artery aneurysm status post stent 8/23/2022, infertility, presents for annual wellness exam. Neurosurgeon, Dr. Brownlee ([facility]) continues to follow patient with serial MRA of brain.  Most recent MRA of head performed March, 2024 and noted to be stable. Mammography and breast ultrasound last performed July, 2023.- Stable with recommendation to repeat 7/2024. Pap smear performed September, 2023 - normal. Fecal occult stool test negative September, 2023 Gynecology exam September, 2023.  Admits to irregular menstrual cycles. (?perimenopausal). Overdue for colonoscopy. Patient offers no new complaints.

## 2024-04-16 NOTE — HEALTH RISK ASSESSMENT
[Good] : ~his/her~  mood as  good [Yes] : Yes [No falls in past year] : Patient reported no falls in the past year [0] : 2) Feeling down, depressed, or hopeless: Not at all (0) [With Significant Other] : lives with significant other [Fully functional (bathing, dressing, toileting, transferring, walking, feeding)] : Fully functional (bathing, dressing, toileting, transferring, walking, feeding) [Fully functional (using the telephone, shopping, preparing meals, housekeeping, doing laundry, using] : Fully functional and needs no help or supervision to perform IADLs (using the telephone, shopping, preparing meals, housekeeping, doing laundry, using transportation, managing medications and managing finances) [Smoke Detector] : smoke detector [Carbon Monoxide Detector] : carbon monoxide detector [Seat Belt] :  uses seat belt [Sunscreen] : uses sunscreen [Never] : Never [de-identified] : socially [de-identified] : Exxercises 4 days per week.  [[id number]] : 0 [Change in mental status noted] : No change in mental status noted [Language] : denies difficulty with language [Behavior] : denies difficulty with behavior [Handling Complex Tasks] : denies difficulty handling complex tasks [Reasoning] : denies difficulty with reasoning [Reports changes in hearing] : Reports no changes in hearing [Reports changes in vision] : Reports no changes in vision [Reports changes in dental health] : Reports no changes in dental health [ColonoscopyDate] : never

## 2025-05-01 ENCOUNTER — LABORATORY RESULT (OUTPATIENT)
Age: 45
End: 2025-05-01

## 2025-05-01 ENCOUNTER — APPOINTMENT (OUTPATIENT)
Dept: INTERNAL MEDICINE | Facility: CLINIC | Age: 45
End: 2025-05-01
Payer: COMMERCIAL

## 2025-05-01 ENCOUNTER — NON-APPOINTMENT (OUTPATIENT)
Age: 45
End: 2025-05-01

## 2025-05-01 VITALS
RESPIRATION RATE: 17 BRPM | WEIGHT: 96 LBS | BODY MASS INDEX: 18.85 KG/M2 | DIASTOLIC BLOOD PRESSURE: 66 MMHG | SYSTOLIC BLOOD PRESSURE: 100 MMHG | OXYGEN SATURATION: 99 % | TEMPERATURE: 97.8 F | HEIGHT: 60 IN | HEART RATE: 69 BPM

## 2025-05-01 DIAGNOSIS — E03.9 HYPOTHYROIDISM, UNSPECIFIED: ICD-10-CM

## 2025-05-01 DIAGNOSIS — E22.1 HYPERPROLACTINEMIA: ICD-10-CM

## 2025-05-01 DIAGNOSIS — Z00.00 ENCOUNTER FOR GENERAL ADULT MEDICAL EXAMINATION W/OUT ABNORMAL FINDINGS: ICD-10-CM

## 2025-05-01 DIAGNOSIS — N97.9 FEMALE INFERTILITY, UNSPECIFIED: ICD-10-CM

## 2025-05-01 PROCEDURE — 99396 PREV VISIT EST AGE 40-64: CPT

## 2025-05-01 PROCEDURE — 93000 ELECTROCARDIOGRAM COMPLETE: CPT

## 2025-05-05 LAB
25(OH)D3 SERPL-MCNC: 43.4 NG/ML
ALBUMIN SERPL ELPH-MCNC: 4.8 G/DL
ALP BLD-CCNC: 74 U/L
ALT SERPL-CCNC: 22 U/L
ANION GAP SERPL CALC-SCNC: 18 MMOL/L
APPEARANCE: CLEAR
AST SERPL-CCNC: 25 U/L
BASOPHILS # BLD AUTO: 0.19 K/UL
BASOPHILS NFR BLD AUTO: 2.6 %
BILIRUB SERPL-MCNC: 0.7 MG/DL
BILIRUBIN URINE: NEGATIVE
BLOOD URINE: NEGATIVE
BUN SERPL-MCNC: 10 MG/DL
CALCIUM SERPL-MCNC: 9.9 MG/DL
CHLORIDE SERPL-SCNC: 101 MMOL/L
CHOLEST SERPL-MCNC: 249 MG/DL
CO2 SERPL-SCNC: 20 MMOL/L
COLOR: YELLOW
CREAT SERPL-MCNC: 0.63 MG/DL
EGFRCR SERPLBLD CKD-EPI 2021: 110 ML/MIN/1.73M2
EOSINOPHIL # BLD AUTO: 0.12 K/UL
EOSINOPHIL NFR BLD AUTO: 1.7 %
ESTIMATED AVERAGE GLUCOSE: 100 MG/DL
FOLATE SERPL-MCNC: >20 NG/ML
GLUCOSE QUALITATIVE U: NEGATIVE MG/DL
GLUCOSE SERPL-MCNC: 83 MG/DL
HBA1C MFR BLD HPLC: 5.1 %
HCT VFR BLD CALC: 43.9 %
HDLC SERPL-MCNC: 92 MG/DL
HGB BLD-MCNC: 14.5 G/DL
KETONES URINE: NEGATIVE MG/DL
LDLC SERPL-MCNC: 142 MG/DL
LEUKOCYTE ESTERASE URINE: NEGATIVE
LYMPHOCYTES # BLD AUTO: 2.98 K/UL
LYMPHOCYTES NFR BLD AUTO: 41.5 %
MAN DIFF?: NORMAL
MCHC RBC-ENTMCNC: 33 G/DL
MCHC RBC-ENTMCNC: 34.8 PG
MCV RBC AUTO: 105.3 FL
MONOCYTES # BLD AUTO: 0.3 K/UL
MONOCYTES NFR BLD AUTO: 4.2 %
NEUTROPHILS # BLD AUTO: 3.59 K/UL
NEUTROPHILS NFR BLD AUTO: 50 %
NITRITE URINE: NEGATIVE
NONHDLC SERPL-MCNC: 157 MG/DL
PH URINE: 6.5
PLATELET # BLD AUTO: 298 K/UL
POTASSIUM SERPL-SCNC: 4.3 MMOL/L
PROLACTIN SERPL-MCNC: 21.7 NG/ML
PROT SERPL-MCNC: 7.6 G/DL
PROTEIN URINE: NEGATIVE MG/DL
RBC # BLD: 4.17 M/UL
RBC # FLD: 12.8 %
SODIUM SERPL-SCNC: 139 MMOL/L
SPECIFIC GRAVITY URINE: <1.005
TRIGL SERPL-MCNC: 88 MG/DL
TSH SERPL-ACNC: 1.31 UIU/ML
UROBILINOGEN URINE: 0.2 MG/DL
VIT B12 SERPL-MCNC: 754 PG/ML
WBC # FLD AUTO: 7.17 K/UL

## 2025-08-27 ENCOUNTER — ASOB RESULT (OUTPATIENT)
Age: 45
End: 2025-08-27

## 2025-08-27 ENCOUNTER — APPOINTMENT (OUTPATIENT)
Dept: OBGYN | Facility: CLINIC | Age: 45
End: 2025-08-27
Payer: COMMERCIAL

## 2025-08-27 PROCEDURE — 76817 TRANSVAGINAL US OBSTETRIC: CPT

## 2025-09-01 ENCOUNTER — NON-APPOINTMENT (OUTPATIENT)
Age: 45
End: 2025-09-01

## 2025-09-02 ENCOUNTER — APPOINTMENT (OUTPATIENT)
Dept: OBGYN | Facility: CLINIC | Age: 45
End: 2025-09-02
Payer: COMMERCIAL

## 2025-09-02 VITALS — OXYGEN SATURATION: 98 % | SYSTOLIC BLOOD PRESSURE: 117 MMHG | HEART RATE: 78 BPM | DIASTOLIC BLOOD PRESSURE: 79 MMHG

## 2025-09-02 VITALS
DIASTOLIC BLOOD PRESSURE: 74 MMHG | TEMPERATURE: 97.8 F | OXYGEN SATURATION: 99 % | HEART RATE: 79 BPM | SYSTOLIC BLOOD PRESSURE: 114 MMHG

## 2025-09-02 VITALS — BODY MASS INDEX: 19.63 KG/M2 | HEIGHT: 60 IN | WEIGHT: 100 LBS

## 2025-09-02 DIAGNOSIS — O02.1 MISSED ABORTION: ICD-10-CM

## 2025-09-02 DIAGNOSIS — Z71.9 COUNSELING, UNSPECIFIED: ICD-10-CM

## 2025-09-02 PROCEDURE — 99204 OFFICE O/P NEW MOD 45 MIN: CPT | Mod: 57,25

## 2025-09-02 PROCEDURE — 76817 TRANSVAGINAL US OBSTETRIC: CPT

## 2025-09-02 PROCEDURE — 59820 CARE OF MISCARRIAGE: CPT

## 2025-09-02 RX ORDER — IBUPROFEN 600 MG/1
600 TABLET, FILM COATED ORAL 4 TIMES DAILY
Qty: 60 | Refills: 0 | Status: ACTIVE | COMMUNITY
Start: 2025-09-02 | End: 1900-01-01

## 2025-09-02 RX ORDER — IBUPROFEN 200 MG/1
200 TABLET ORAL
Refills: 0 | Status: COMPLETED | OUTPATIENT
Start: 2025-09-02

## 2025-09-02 RX ORDER — DOXYCYCLINE HYCLATE 100 MG/1
100 TABLET ORAL
Qty: 2 | Refills: 0 | Status: ACTIVE | COMMUNITY
Start: 2025-09-02 | End: 1900-01-01

## 2025-09-02 RX ADMIN — IBUPROFEN 0 MG: 200 TABLET, FILM COATED ORAL at 00:00

## 2025-09-08 ENCOUNTER — NON-APPOINTMENT (OUTPATIENT)
Age: 45
End: 2025-09-08

## 2025-09-08 LAB — CORE LAB BIOPSY: NORMAL

## 2025-09-17 ENCOUNTER — APPOINTMENT (OUTPATIENT)
Dept: OBGYN | Facility: CLINIC | Age: 45
End: 2025-09-17